# Patient Record
Sex: MALE | Race: OTHER | URBAN - METROPOLITAN AREA
[De-identification: names, ages, dates, MRNs, and addresses within clinical notes are randomized per-mention and may not be internally consistent; named-entity substitution may affect disease eponyms.]

---

## 2022-12-06 ENCOUNTER — APPOINTMENT (RX ONLY)
Dept: URBAN - METROPOLITAN AREA CLINIC 61 | Facility: CLINIC | Age: 62
Setting detail: DERMATOLOGY
End: 2022-12-06

## 2022-12-06 DIAGNOSIS — D22 MELANOCYTIC NEVI: ICD-10-CM

## 2022-12-06 DIAGNOSIS — Z71.89 OTHER SPECIFIED COUNSELING: ICD-10-CM

## 2022-12-06 DIAGNOSIS — L82.1 OTHER SEBORRHEIC KERATOSIS: ICD-10-CM

## 2022-12-06 DIAGNOSIS — L81.4 OTHER MELANIN HYPERPIGMENTATION: ICD-10-CM

## 2022-12-06 DIAGNOSIS — D49.2 NEOPLASM OF UNSPECIFIED BEHAVIOR OF BONE, SOFT TISSUE, AND SKIN: ICD-10-CM | Status: WORSENING

## 2022-12-06 PROBLEM — D22.5 MELANOCYTIC NEVI OF TRUNK: Status: ACTIVE | Noted: 2022-12-06

## 2022-12-06 PROCEDURE — ? COUNSELING

## 2022-12-06 PROCEDURE — ? ADDITIONAL NOTES

## 2022-12-06 PROCEDURE — ? TREATMENT REGIMEN

## 2022-12-06 PROCEDURE — ? SHAVE REMOVAL

## 2022-12-06 PROCEDURE — ? FULL BODY SKIN EXAM

## 2022-12-06 PROCEDURE — 99203 OFFICE O/P NEW LOW 30 MIN: CPT | Mod: 25

## 2022-12-06 PROCEDURE — 11300 SHAVE SKIN LESION 0.5 CM/<: CPT

## 2022-12-06 PROCEDURE — 11302 SHAVE SKIN LESION 1.1-2.0 CM: CPT

## 2022-12-06 ASSESSMENT — LOCATION DETAILED DESCRIPTION DERM
LOCATION DETAILED: SUPERIOR THORACIC SPINE
LOCATION DETAILED: PERIUMBILICAL SKIN
LOCATION DETAILED: INFERIOR THORACIC SPINE
LOCATION DETAILED: LEFT PROXIMAL DORSAL FOREARM
LOCATION DETAILED: LEFT ANTERIOR PROXIMAL THIGH
LOCATION DETAILED: RIGHT PROXIMAL RADIAL DORSAL FOREARM

## 2022-12-06 ASSESSMENT — LOCATION SIMPLE DESCRIPTION DERM
LOCATION SIMPLE: LEFT THIGH
LOCATION SIMPLE: RIGHT FOREARM
LOCATION SIMPLE: LEFT FOREARM
LOCATION SIMPLE: ABDOMEN
LOCATION SIMPLE: UPPER BACK

## 2022-12-06 ASSESSMENT — LOCATION ZONE DERM
LOCATION ZONE: TRUNK
LOCATION ZONE: ARM
LOCATION ZONE: LEG

## 2022-12-06 NOTE — PROCEDURE: SHAVE REMOVAL
Medical Necessity Information: It is in your best interest to select a reason for this procedure from the list below. All of these items fulfill various CMS LCD requirements except the new and changing color options.
Medical Necessity Clause: The provider's intent was to remove the lesion in its entirety while at the same time obtaining tissue for pathology. This procedure was medically necessary because the lesion that was treated was:
Lab: 6
Lab Facility: 3
Detail Level: Detailed
Was A Bandage Applied: Yes
Size Of Lesion In Cm (Required): 1.2
X Size Of Lesion In Cm (Optional): 0
Depth Of Shave: dermis
Biopsy Method: Personna blade
Anesthesia Type: 1% lidocaine with epinephrine
Anesthesia Volume In Cc: 0.5
Hemostasis: Drysol
Wound Care: Vaseline
Render Path Notes In Note?: No
Consent was obtained from the patient. The risks and benefits to therapy were discussed in detail. Specifically, the risks of infection, scarring, bleeding, prolonged wound healing, incomplete removal, allergy to anesthesia, nerve injury and recurrence were addressed. Prior to the procedure, the treatment site was clearly identified and confirmed by the patient. All components of Universal Protocol completed.
Post-Care Instructions: I reviewed with the patient in detail post-care instructions. Patient is to keep the biopsy site dry overnight, and then apply vaseline twice daily until healed.
Notification Instructions: Patient will be notified of pathology results. However, patient instructed to call the office if not contacted within 2 weeks.
Billing Type: Third-Party Bill
Size Of Lesion In Cm (Required): 0.3

## 2023-01-04 ENCOUNTER — APPOINTMENT (RX ONLY)
Dept: URBAN - METROPOLITAN AREA CLINIC 61 | Facility: CLINIC | Age: 63
Setting detail: DERMATOLOGY
End: 2023-01-04

## 2023-01-04 DIAGNOSIS — L90.5 SCAR CONDITIONS AND FIBROSIS OF SKIN: ICD-10-CM | Status: RESOLVING

## 2023-01-04 PROCEDURE — ? COUNSELING

## 2023-01-04 PROCEDURE — 99212 OFFICE O/P EST SF 10 MIN: CPT

## 2023-01-04 ASSESSMENT — LOCATION SIMPLE DESCRIPTION DERM
LOCATION SIMPLE: ABDOMEN
LOCATION SIMPLE: LEFT THIGH

## 2023-01-04 ASSESSMENT — LOCATION ZONE DERM
LOCATION ZONE: TRUNK
LOCATION ZONE: LEG

## 2023-01-04 ASSESSMENT — LOCATION DETAILED DESCRIPTION DERM
LOCATION DETAILED: PERIUMBILICAL SKIN
LOCATION DETAILED: LEFT ANTERIOR PROXIMAL THIGH

## 2024-08-07 ENCOUNTER — LAB (OUTPATIENT)
Dept: LAB | Facility: CLINIC | Age: 64
End: 2024-08-07
Payer: COMMERCIAL

## 2024-08-07 ENCOUNTER — HOSPITAL ENCOUNTER (OUTPATIENT)
Dept: RADIOLOGY | Facility: HOSPITAL | Age: 64
Discharge: HOME | End: 2024-08-07
Payer: COMMERCIAL

## 2024-08-07 ENCOUNTER — OFFICE VISIT (OUTPATIENT)
Dept: FAMILY MEDICINE | Facility: CLINIC | Age: 64
End: 2024-08-07
Payer: COMMERCIAL

## 2024-08-07 VITALS
HEIGHT: 75 IN | TEMPERATURE: 98.4 F | HEART RATE: 86 BPM | BODY MASS INDEX: 31.21 KG/M2 | SYSTOLIC BLOOD PRESSURE: 154 MMHG | OXYGEN SATURATION: 90 % | WEIGHT: 251 LBS | RESPIRATION RATE: 22 BRPM | DIASTOLIC BLOOD PRESSURE: 88 MMHG

## 2024-08-07 DIAGNOSIS — R05.1 ACUTE COUGH: ICD-10-CM

## 2024-08-07 DIAGNOSIS — J06.9 UPPER RESPIRATORY TRACT INFECTION, UNSPECIFIED TYPE: ICD-10-CM

## 2024-08-07 DIAGNOSIS — E87.70 HYPERVOLEMIA, UNSPECIFIED HYPERVOLEMIA TYPE: ICD-10-CM

## 2024-08-07 DIAGNOSIS — R06.02 SHORTNESS OF BREATH: Primary | ICD-10-CM

## 2024-08-07 LAB
ALBUMIN SERPL-MCNC: 4.3 G/DL (ref 3.5–5.3)
ALP SERPL-CCNC: 98 U/L (ref 45–115)
ALT SERPL-CCNC: 20 U/L (ref 7–52)
ANION GAP SERPL CALC-SCNC: 9 MMOL/L (ref 3–11)
AST SERPL-CCNC: 20 U/L
BASOPHILS # BLD AUTO: 0 10*3/UL
BASOPHILS NFR BLD AUTO: 0.5 % (ref 0–2)
BILIRUB SERPL-MCNC: 0.77 MG/DL (ref 0.2–1.4)
BNP SERPL-MCNC: 124 PG/ML (ref 0–100)
BUN SERPL-MCNC: 18 MG/DL (ref 7–25)
CALCIUM ALBUM COR SERPL-MCNC: 9.1 MG/DL (ref 8.6–10.3)
CALCIUM SERPL-MCNC: 9.3 MG/DL (ref 8.6–10.3)
CHLORIDE SERPL-SCNC: 106 MMOL/L (ref 98–107)
CO2 SERPL-SCNC: 23 MMOL/L (ref 21–32)
CREAT SERPL-MCNC: 1.19 MG/DL (ref 0.7–1.3)
EGFRCR SERPLBLD CKD-EPI 2021: 69 ML/MIN/1.73M*2
EOSINOPHIL # BLD AUTO: 0.2 10*3/UL
EOSINOPHIL NFR BLD AUTO: 3.2 % (ref 0–3)
ERYTHROCYTE [DISTWIDTH] IN BLOOD BY AUTOMATED COUNT: 14 % (ref 11.5–15)
FLUAV RNA NPH QL NAA+NON-PROBE: NEGATIVE
FLUBV RNA NPH QL NAA+NON-PROBE: NEGATIVE
GLUCOSE SERPL-MCNC: 94 MG/DL (ref 70–105)
HCT VFR BLD AUTO: 40.2 % (ref 38–50)
HGB BLD-MCNC: 13.8 G/DL (ref 13.2–17.2)
LYMPHOCYTES # BLD AUTO: 0.8 10*3/UL
LYMPHOCYTES NFR BLD AUTO: 11.1 % (ref 15–47)
MCH RBC QN AUTO: 29.6 PG (ref 29–34)
MCHC RBC AUTO-ENTMCNC: 34.4 G/DL (ref 32–36)
MCV RBC AUTO: 86.1 FL (ref 82–97)
MONOCYTES # BLD AUTO: 0.6 10*3/UL
MONOCYTES NFR BLD AUTO: 8.6 % (ref 5–13)
NEUTROPHILS # BLD AUTO: 5.7 10*3/UL
NEUTROPHILS NFR BLD AUTO: 76.6 % (ref 46–70)
PLATELET # BLD AUTO: 315 10*3/UL (ref 130–350)
PMV BLD AUTO: 7.1 FL (ref 6.9–10.8)
POTASSIUM SERPL-SCNC: 4.5 MMOL/L (ref 3.5–5.1)
PROT SERPL-MCNC: 7.2 G/DL (ref 6–8.3)
RBC # BLD AUTO: 4.67 10*6/ΜL (ref 4.1–5.8)
SARS-COV-2 RNA RESP QL NAA+PROBE: NEGATIVE
SODIUM SERPL-SCNC: 138 MMOL/L (ref 135–145)
WBC # BLD AUTO: 7.4 10*3/UL (ref 3.7–9.6)

## 2024-08-07 PROCEDURE — 99204 OFFICE O/P NEW MOD 45 MIN: CPT | Mod: 25 | Performed by: NURSE PRACTITIONER

## 2024-08-07 PROCEDURE — 94640 AIRWAY INHALATION TREATMENT: CPT | Performed by: NURSE PRACTITIONER

## 2024-08-07 PROCEDURE — 36415 COLL VENOUS BLD VENIPUNCTURE: CPT | Performed by: NURSE PRACTITIONER

## 2024-08-07 PROCEDURE — 71046 X-RAY EXAM CHEST 2 VIEWS: CPT

## 2024-08-07 PROCEDURE — 83880 ASSAY OF NATRIURETIC PEPTIDE: CPT | Performed by: NURSE PRACTITIONER

## 2024-08-07 PROCEDURE — 80053 COMPREHEN METABOLIC PANEL: CPT | Performed by: NURSE PRACTITIONER

## 2024-08-07 PROCEDURE — 87636 SARSCOV2 & INF A&B AMP PRB: CPT | Performed by: NURSE PRACTITIONER

## 2024-08-07 PROCEDURE — 85025 COMPLETE CBC W/AUTO DIFF WBC: CPT | Performed by: NURSE PRACTITIONER

## 2024-08-07 RX ORDER — ALBUTEROL SULFATE 90 UG/1
2 INHALANT RESPIRATORY (INHALATION) EVERY 6 HOURS PRN
Qty: 18 G | Refills: 0 | Status: SHIPPED | OUTPATIENT
Start: 2024-08-07

## 2024-08-07 RX ORDER — MULTIVITAMIN
1 TABLET ORAL DAILY
COMMUNITY

## 2024-08-07 RX ORDER — IPRATROPIUM BROMIDE 0.5 MG/2.5ML
0.5 SOLUTION RESPIRATORY (INHALATION) ONCE
Status: COMPLETED | OUTPATIENT
Start: 2024-08-07 | End: 2024-08-07

## 2024-08-07 RX ORDER — OMEGA-3 FATTY ACIDS/FISH OIL 340-1000MG
CAPSULE ORAL
COMMUNITY

## 2024-08-07 RX ORDER — ALBUTEROL SULFATE 0.83 MG/ML
2.5 SOLUTION RESPIRATORY (INHALATION) ONCE
Status: COMPLETED | OUTPATIENT
Start: 2024-08-07 | End: 2024-08-07

## 2024-08-07 RX ORDER — FUROSEMIDE 20 MG/1
20 TABLET ORAL 2 TIMES DAILY
Qty: 6 TABLET | Refills: 0 | Status: SHIPPED | OUTPATIENT
Start: 2024-08-07 | End: 2024-08-10

## 2024-08-07 RX ORDER — AZITHROMYCIN 250 MG/1
TABLET, FILM COATED ORAL
Qty: 6 TABLET | Refills: 0 | Status: SHIPPED | OUTPATIENT
Start: 2024-08-07

## 2024-08-07 RX ADMIN — IPRATROPIUM BROMIDE 0.5 MG: 0.5 SOLUTION RESPIRATORY (INHALATION) at 11:00

## 2024-08-07 RX ADMIN — ALBUTEROL SULFATE 2.5 MG: 0.83 SOLUTION RESPIRATORY (INHALATION) at 10:59

## 2024-08-07 ASSESSMENT — ENCOUNTER SYMPTOMS
FATIGUE: 1
FEVER: 1
PALPITATIONS: 0
CHILLS: 1
COUGH: 1
RHINORRHEA: 1
SHORTNESS OF BREATH: 1
WHEEZING: 1

## 2024-08-07 NOTE — PROGRESS NOTES
Subjective     Chief Complaint   Patient presents with    Shortness of Breath     Coughing, wheezing, SOB, fatigue, chills started 7/29 and getting worse.        Juwan Lucas is a 64 y.o. male who presents for cough, wheezing and shortness of breath.     History of Present Illness    A 64-year-old individual with a history of mitral valve repair and orthopedic surgeries, including hip replacement and knee and elbow debridement, presents with worsening symptoms of cough, wheezing, shortness of breath, fatigue, and chills that began on July 29th. The patient reports feeling feverish and congested, with nights of profuse sweating. He denies any history of frequent pneumonia. 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    The patient also reports a history of chronic sinus infection, which had previously compromised his dental health. This was managed by an ENT specialist who performed a sinus scrape and prescribed an antibiotic nasal rinse. The patient has been experiencing gastrointestinal issues, which he attributes to dietary indiscretions, specifically the consumption of high-fat foods and cream-based dishes.    The patient reports shortness of breath when lying down and during physical exertion, such as walking a couple of blocks or climbing hills. He denies any swelling in the legs. Despite these symptoms, the patient has been able to participate in a tour at [employer], although he found it physically challenging due to breathlessness.    The patient denies any issues with his kidneys and reports that his mitral valve repair has been successful. He has been taking a multivitamin and fish oil, with no other medications reported.         The following have been reviewed and updated as appropriate in this visit:   Tobacco  Allergies  Meds   "Problems  Med Hx  Surg Hx  Fam Hx  Soc   Hx        History reviewed. No pertinent past medical history.  Current Outpatient Medications   Medication Sig Dispense Refill    multivitamin (THERAGRAN) tablet tablet Take 1 tablet by mouth daily      omega-3 fatty acids-fish oil (Fish OiL) 340-1,000 mg capsule Take by mouth      furosemide (Lasix) 20 mg tablet Take 1 tablet (20 mg total) by mouth 2 (two) times a day for 3 days 6 tablet 0    albuterol HFA 90 mcg/actuation inhaler Inhale 2 puffs every 6 (six) hours as needed for wheezing or shortness of breath 18 g 0    azithromycin (Zithromax Z-Lewis) 250 mg tablet Take 500 mg (2 tablets) PO the first day, then 250 mg (1 tablet) PO daily for 4 days. 6 tablet 0     No current facility-administered medications for this visit.       Review of Systems   Constitutional:  Positive for chills, fatigue and fever.   HENT:  Positive for postnasal drip and rhinorrhea.    Respiratory:  Positive for cough, shortness of breath (with activity and laying down) and wheezing.    Cardiovascular:  Negative for chest pain, palpitations and leg swelling.       Objective   /88 (BP Location: Right arm, Patient Position: Sitting, Cuff Size: Long Adult)   Pulse 86   Temp 36.9 °C (98.4 °F)   Resp 22   Ht 1.905 m (6' 3\")   Wt 113.9 kg (251 lb)   SpO2 90%   BMI 31.37 kg/m²         Physical Exam  Vitals and nursing note reviewed. Exam conducted with a chaperone present.   Constitutional:       General: He is not in acute distress.     Appearance: He is well-developed. He is not ill-appearing, toxic-appearing or diaphoretic.   HENT:      Head: Normocephalic and atraumatic.      Right Ear: Tympanic membrane normal.      Left Ear: Tympanic membrane normal.      Nose: Congestion and rhinorrhea present.   Eyes:      Conjunctiva/sclera: Conjunctivae normal.   Cardiovascular:      Rate and Rhythm: Normal rate and regular rhythm.      Heart sounds: Normal heart sounds. No murmur " heard.  Pulmonary:      Effort: Pulmonary effort is normal. No respiratory distress.      Breath sounds: Wheezing (mild) present.      Comments: Coughing during exam  Musculoskeletal:      Right lower leg: No edema.      Left lower leg: No edema.   Skin:     General: Skin is warm.      Findings: No rash.   Neurological:      General: No focal deficit present.      Mental Status: He is alert and oriented to person, place, and time.   Psychiatric:         Behavior: Behavior normal.         Thought Content: Thought content normal.         Judgment: Judgment normal.       Duo neb given x one treatment with improvement of symptoms and resolution of wheezing/rhonchi.     X-ray chest 2 views  Narrative: EXAM:  DX CHEST 2 VW    DATE: 8/7/2024 10:40 AM    INDICATION: Shortness of breath; Acute cough; shortness of breath    COMPARISON: None available    TECHNIQUE:  2 views    FINDINGS:  The heart is not enlarged. Perihilar opacities. No pleural effusion or pneumothorax.  Impression: IMPRESSION:  Mild perihilar opacities, which could represent atelectasis or mild pulmonary edema.        ASSESSMENT AND PLAN     Diagnoses and all orders for this visit:    Shortness of breath  -     X-ray chest 2 views  -     SARS/INFLUENZA PCR  -     B-type natriuretic peptide (BNP) Blood, Venous  -     Nebulizer; Future  -     albuterol 2.5 mg/3 mL nebulizer solution 2.5 mg  -     ipratropium (ATROVENT) 0.02 % nebulizer solution 0.5 mg    Acute cough  -     X-ray chest 2 views  -     SARS/INFLUENZA PCR  -     CBC w/auto differential Blood, Venous  -     Comprehensive metabolic panel Blood, Venous  -     Nebulizer; Future  -     albuterol 2.5 mg/3 mL nebulizer solution 2.5 mg  -     ipratropium (ATROVENT) 0.02 % nebulizer solution 0.5 mg  -     albuterol HFA 90 mcg/actuation inhaler; Inhale 2 puffs every 6 (six) hours as needed for wheezing or shortness of breath  -     azithromycin (Zithromax Z-Lewis) 250 mg tablet; Take 500 mg (2 tablets) PO the  first day, then 250 mg (1 tablet) PO daily for 4 days.    Hypervolemia, unspecified hypervolemia type  -     furosemide (Lasix) 20 mg tablet; Take 1 tablet (20 mg total) by mouth 2 (two) times a day for 3 days    Upper respiratory tract infection, unspecified type    Labs show mildly elevated BNP at 124, no previous to compare.  CMP shows normal sodium, potassium and creatinine.  White blood cell count is normal range.  Neutrophils mildly elevated at 76.6.    Assessment/Plan     Respiratory Infection  Presented with cough, wheezing, shortness of breath, fatigue, and chills. No pneumonia on chest x-ray, but atelectasis noted. No evidence of infection on blood work. Negative for COVID and influenza.  Given a DuoNeb in the clinic with improvement of breathing.  -Start Zithromax (Z-Lewis) to cover possible infection as symptoms not improving in over a week.   -Use Albuterol inhaler as needed for shortness of breath or wheezing.    Fluid Overload  Chest x-ray shows mild perihilar opacities which could represent atelectasis or mild pulmonary edema.  Mildly elevated BNP (124), possibly due to change in elevation from [place] to [place]. Experiencing shortness of breath  -Start Lasix twice a day for the next three days to reduce fluid overload.    Follow-up  Patient to call if not feeling better in the next 24 to 48 hours.           ILSA GUILLEN, CNP

## 2024-08-07 NOTE — PATIENT INSTRUCTIONS
Call if you are not feeling better in the next 24-48 hours [phone]    I am going to give you an antibiotic to cover any possible infection.  An albuterol inhaler that you can use as needed for shortness of breath or wheezing.  I am also going to give you Lasix which is a diuretic.  This will help to get rid of some fluid and hopefully help improve shortness of breath.  You should take this twice a day, morning and early afternoon for the next 3 days